# Patient Record
Sex: FEMALE | Race: BLACK OR AFRICAN AMERICAN
[De-identification: names, ages, dates, MRNs, and addresses within clinical notes are randomized per-mention and may not be internally consistent; named-entity substitution may affect disease eponyms.]

---

## 2021-01-07 ENCOUNTER — HOSPITAL ENCOUNTER (EMERGENCY)
Dept: HOSPITAL 35 - ER | Age: 41
Discharge: HOME | End: 2021-01-07
Payer: COMMERCIAL

## 2021-01-07 VITALS — SYSTOLIC BLOOD PRESSURE: 182 MMHG | DIASTOLIC BLOOD PRESSURE: 85 MMHG

## 2021-01-07 VITALS — HEIGHT: 67 IN | BODY MASS INDEX: 28.25 KG/M2 | WEIGHT: 180.01 LBS

## 2021-01-07 DIAGNOSIS — Z88.8: ICD-10-CM

## 2021-01-07 DIAGNOSIS — N18.6: ICD-10-CM

## 2021-01-07 DIAGNOSIS — Z91.018: ICD-10-CM

## 2021-01-07 DIAGNOSIS — R56.9: Primary | ICD-10-CM

## 2021-01-07 DIAGNOSIS — Z99.2: ICD-10-CM

## 2021-01-07 DIAGNOSIS — Z79.899: ICD-10-CM

## 2021-01-07 LAB
ALBUMIN SERPL-MCNC: 3.5 G/DL (ref 3.4–5)
ALT SERPL-CCNC: 20 U/L (ref 14–59)
ANION GAP SERPL CALC-SCNC: 15 MMOL/L (ref 7–16)
AST SERPL-CCNC: 28 U/L (ref 15–37)
BASOPHILS NFR BLD AUTO: 0.8 % (ref 0–2)
BILIRUB SERPL-MCNC: 0.5 MG/DL (ref 0.2–1)
BUN SERPL-MCNC: 19 MG/DL (ref 7–18)
CALCIUM SERPL-MCNC: 9.5 MG/DL (ref 8.5–10.1)
CHLORIDE SERPL-SCNC: 98 MMOL/L (ref 98–107)
CO2 SERPL-SCNC: 25 MMOL/L (ref 21–32)
CREAT SERPL-MCNC: 10.8 MG/DL (ref 0.6–1)
EOSINOPHIL NFR BLD: 1.6 % (ref 0–3)
ERYTHROCYTE [DISTWIDTH] IN BLOOD BY AUTOMATED COUNT: 16.2 % (ref 10.5–14.5)
GLUCOSE SERPL-MCNC: 107 MG/DL (ref 74–106)
GRANULOCYTES NFR BLD MANUAL: 76.4 % (ref 36–66)
HCT VFR BLD CALC: 35.8 % (ref 37–47)
HGB BLD-MCNC: 11.5 GM/DL (ref 12–15)
LYMPHOCYTES NFR BLD AUTO: 12.5 % (ref 24–44)
MCH RBC QN AUTO: 24.4 PG (ref 26–34)
MCHC RBC AUTO-ENTMCNC: 32 G/DL (ref 28–37)
MCV RBC: 76.3 FL (ref 80–100)
MONOCYTES NFR BLD: 8.7 % (ref 1–8)
NEUTROPHILS # BLD: 4.6 THOU/UL (ref 1.4–8.2)
PLATELET # BLD: 110 THOU/UL (ref 150–400)
POTASSIUM SERPL-SCNC: 4.3 MMOL/L (ref 3.5–5.1)
PROT SERPL-MCNC: 8.4 G/DL (ref 6.4–8.2)
RBC # BLD AUTO: 4.69 MIL/UL (ref 4.2–5)
SODIUM SERPL-SCNC: 138 MMOL/L (ref 136–145)
WBC # BLD AUTO: 6 THOU/UL (ref 4–11)

## 2021-08-09 ENCOUNTER — HOSPITAL ENCOUNTER (INPATIENT)
Dept: HOSPITAL 35 - ER | Age: 41
LOS: 2 days | Discharge: HOME | DRG: 100 | End: 2021-08-11
Attending: INTERNAL MEDICINE | Admitting: INTERNAL MEDICINE
Payer: COMMERCIAL

## 2021-08-09 VITALS — WEIGHT: 152 LBS | BODY MASS INDEX: 25.33 KG/M2 | HEIGHT: 65 IN

## 2021-08-09 VITALS — SYSTOLIC BLOOD PRESSURE: 149 MMHG | DIASTOLIC BLOOD PRESSURE: 96 MMHG

## 2021-08-09 DIAGNOSIS — Z82.49: ICD-10-CM

## 2021-08-09 DIAGNOSIS — F17.210: ICD-10-CM

## 2021-08-09 DIAGNOSIS — G40.909: Primary | ICD-10-CM

## 2021-08-09 DIAGNOSIS — Z20.822: ICD-10-CM

## 2021-08-09 DIAGNOSIS — Z91.02: ICD-10-CM
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
## 2021-08-09 DIAGNOSIS — I12.0: ICD-10-CM
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
## 2021-08-09 DIAGNOSIS — E44.0: ICD-10-CM

## 2021-08-09 DIAGNOSIS — Z90.5: ICD-10-CM

## 2021-08-09 DIAGNOSIS — K14.8: ICD-10-CM
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
## 2021-08-09 DIAGNOSIS — Z91.19: ICD-10-CM

## 2021-08-09 DIAGNOSIS — N18.6: ICD-10-CM

## 2021-08-09 DIAGNOSIS — Z84.1: ICD-10-CM

## 2021-08-09 DIAGNOSIS — S01.512A: ICD-10-CM

## 2021-08-09 DIAGNOSIS — Z88.8: ICD-10-CM

## 2021-08-09 DIAGNOSIS — R13.10: ICD-10-CM

## 2021-08-09 LAB
ALBUMIN SERPL-MCNC: 2.7 G/DL (ref 3.4–5)
ALT SERPL-CCNC: 103 U/L (ref 30–65)
ANION GAP SERPL CALC-SCNC: 12 MMOL/L (ref 7–16)
APTT BLD: 26.8 SECONDS (ref 24.5–32.8)
AST SERPL-CCNC: 177 U/L (ref 15–37)
BILIRUB SERPL-MCNC: 0.7 MG/DL (ref 0.2–1)
BUN SERPL-MCNC: 32 MG/DL (ref 7–18)
CALCIUM SERPL-MCNC: 8.7 MG/DL (ref 8.5–10.1)
CHLORIDE SERPL-SCNC: 102 MMOL/L (ref 98–107)
CO2 SERPL-SCNC: 25 MMOL/L (ref 21–32)
CREAT SERPL-MCNC: 10 MG/DL (ref 0.6–1)
GLUCOSE SERPL-MCNC: 82 MG/DL (ref 74–106)
INR PPP: 1.1
POTASSIUM SERPL-SCNC: 4.7 MMOL/L (ref 3.5–5.1)
PROT SERPL-MCNC: 7.7 G/DL (ref 6.4–8.2)
PROTHROMBIN TIME: 11.1 SECONDS (ref 9.3–11.4)
SODIUM SERPL-SCNC: 139 MMOL/L (ref 136–145)

## 2021-08-09 NOTE — EMS
[address]   [address]                     EMS Patient Care Report       
_______________________________________________________________________________
 
Name:       DEBI MOONEY            Room #:         214-P       [facility] [place]  
M.R.#:      [id number]                       Account #:      [id number]  
Admission:  21    Attend Phys:    Melissa Castillo MD   
Discharge:  21    Date of Birth:  [birth year]  
                                                          Report #: [id number]
                                                                    [id number]
_______________________________________________________________________________
THIS REPORT FOR:   //name//                          
 
Report Transmitted: 2021 09:41
EMS Care Summary
[place], [place]/[facility]
Incident [id number] @ 2021 01:16
 
Incident Location
[address] [place], [place] [id number]
 
Patient
DEBI MOONEY
Female, 41 Years
 1980
 
Patient Address
[address] [place], [place] [id number]
 
Patient History
Seizures,Dialysis,
 
 
Patient Medications
Carvedilol,
 
Chief Complaint
SWELLING AND BLEEDING TO TONGUE.
 
Disposition
Transported No Lights/[place]
 
Dispatch Reason
Hemorrhage/Laceration
 
Transported To
[facility]
 
Narrative
MEDIC 30 RESPONDS TO AN APARTMENT COMPLEX ON A REPORTED HEMORRHAGE. UPON 
ARRIVAL EMS FINDS ADULT FEMALE SITTING UPRIGHT ON BED WHILE HOLDING A TOWEL TO 
MOUTH. AS EMS APPROACHES, NUMEROUS BLOOD STAINS ARE VISIBLE TO TOWEL, AS WELL 
AS NUMEROUS OTHER ARTICLES OF CLOTHING, BED SHEETS AND SIMILAR ITEMS IN 
 
 
 
[address]   [address]                     EMS Patient Care Report       
_______________________________________________________________________________
 
Name:       DEBI MOONEY            Room #:         214-P       [facility] [place]  
[facility].#:      [id number]                       Account #:      [id number]  
Admission:  21    Attend Phys:    Melissa Castillo MD   
Discharge:  21    Date of Birth:  [birth year]  
                                                          Report #: [id number]
                                                                    [id number]
_______________________________________________________________________________
BEDROOM. PT'S ADULT SON PROVIDES HPI FOR PT AS PT APPEARS TO HAVE DIFFICULTY 
SPEAKING WITH MOUTH INJURY. SON REPORTS PT HAD MULTIPLE SEIZURES EARLIER THIS 
AFTERNOON, AND DURING ONE SEIZURE PT BIT HER TONGUE, CAUSING A WOUND AND 
EXCESSIVE BLEEDING. SON REPORTS PT WAS TREATED AT [facility] ED AND WAS 
DISCHARGED AT APPROXIMATELY 20:00-21:00 HOURS. SON REPORTS PT HAS CONTINUED TO 
HAVE BLEEDING AND DROOLING SINCE BEING DISCHARGED, AND SON HAS HAD TO PROVIDE 
WATER THROUGH A DROPPER TO GIVE PT FLUIDS AROUND SWOLLEN TONGUE. SON REPORTS 
BEING UPSET THAT PT WAS DISCHARGED AND REQUESTS PT BE REEVALUATED AT A 
DIFFERENT HOSPITAL. TONGUE APPEARS TO HAVE AN APPROXIMATE GOLF BALL SIZED 
AMOUNT OF SWELLING, TAKING UP THE MAJORITY OF ORAL AIRWAY. PT APPEARS TO HAVE 
SLOW AND GRADUAL DROOLING AND BLEEDING FROM TONGUE, WHICH PT CONTROLS WITH 
TOWEL. NO OTHER OBVIOUS EXTERNAL TRAUMA NOTED. RESPIRATIONS APPEAR ADEQUATE AND 
CURRENTLY NON LABORED. PT TRANSPORTED WITH ONGOING ASSESSMENT. REPORT TO STAFF 
UPON ARRIVAL. 
 
Initial Vitals
@01:39P: 106,R: 16,CO: 6,
@01:45P: 90,R: 16,BP: 151/92,SpO2: 96,
@01:34P: 98,R: 17,BP: 149/97,Pain: 10/10,GCS: 15,Revised Trauma: 12,
 
Assessments
@01:24MENTAL:Event Oriented,Place Oriented,Person Oriented,Time 
Oriented,SKIN:HEENT:Neck/Airway: Obstructed,Head/Face: Swelling,Head/Face: 
Other,LUNG SOUNDS:ABDOMEN:PELVIS//GI:EXTREMITIES:Left Arm: No 
Abnormalities,Right Arm: No Abnormalities,Left Leg: No Abnormalities,Right Leg: 
No Abnormalities,PULSE:Radial: 2+ Normal,NEURO:Slurred Speech,@01:35MENTAL:Time 
Oriented,Event Oriented,Person Oriented,Place Oriented,SKIN:HEENT:Neck/Airway: 
Obstructed,Head/Face: Swelling,LUNG 
SOUNDS:ABDOMEN:PELVIS//GI:EXTREMITIES:Left Arm: No Abnormalities,Right Arm: 
No Abnormalities,Left Leg: No Abnormalities,Right Leg: No 
Abnormalities,PULSE:NEURO:Slurred Speech, 
 
Impression
Hemorrhage
 
Procedures
@01:24ALS AssessmentResponse: UnchangedSucceeded@:31StretcherResponse: 
Unchanged 
 
Timeline
01:13,Call Received
01:13,Dispatch Notified
01:16,Dispatched
01:16,En Route
01:21,On Scene
01:24,At Patient
 
 
 
[address]   [address]                     EMS Patient Care Report       
_______________________________________________________________________________
 
Name:       DEBI MOONEY            Room #:         214-P       [facility] [place]  
M.R.#:      [id number]                       Account #:      [id number]  
Admission:  21    Attend Phys:    Melissa Castillo MD   
Discharge:  21    Date of Birth:  [birth year]  
                                                          Report #: [id number]
                                                                    [id number]
_______________________________________________________________________________
01:24,ALS Assessment,Response: UnchangedSucceeded,
01:31,Stretcher,Response: Unchanged
01:34,BP: 149/97 M,PULSE: 98,RR: 17 R,SPO2:  Ox,ETCO2:  ,BG: ,PAIN: 10,GCS: 15,
01:36,Depart Scene
01:39,BP: / M,PULSE: 106,RR: 16 R,SPO2:  Ox,ETCO2:  ,BG: ,PAIN: ,GCS: ,
01:45,BP: 151/92 M,PULSE: 90,RR: 16 R,SPO2: 96 Ox,ETCO2:  ,BG: ,PAIN: ,GCS: ,
01:46,At Destination
02:16,Call Closed
 
Disclaimer
v1.1     Copyright  [employer]
This EMS Care Summary contains data elements from the applicable legal record 
(which may be displayed differently). It is designed to provide pertinent 
information for the following purposes: continuity of care, clinical quality, 
and state data reporting. The complete legal record is available to ED staff 
and administrators of the receiving hospital in [employer]'s Patient Tracker. All data 
is provided "as is."

## 2021-08-09 NOTE — NUR
41-year-old female with past medical history of end-stage renal disease,
epilepsy who presented to the ED on 8-9-21 with tongue pain and swelling.
Patient reports that she was seen at [facility] approximately 3
to 4 hours ago after having a seizure.  She states that she was discharged
from the emergency department however since that time has noticed worsening
tongue swelling.  Per the ED patient presents A&O x4 and negative on ID NOW
with assessment of yes to vaccination.  Patient was seen ENT on 8-9 in the ED
and noted as ESRD with dialysis next scheduled for 8-11-21 and persistent
oozing of the tongue and difficulty swallowing.
 
CM will follow for discharge planning as medical care team establish
identified discharge needs as care continues and consultants continue to
review.

## 2021-08-09 NOTE — EEG
[facility]
[address]  [address]                      ELECTROENCEPHALOGRAM          
_______________________________________________________________________________
 
Name:       DEBI MOONEY             Room #:         214-P       ADM [place]  
M.R.#:      [id number]      Account #:      [id number]  
Admission:  08/09/21     Attend Phys:    Melissa Castillo MD  
Discharge:               Date of Birth:  [birth year]  
                                                           Report #: [id number]
    [id number]
_______________________________________________________________________________
THIS REPORT FOR:   //name//                          
 
DATE OF SERVICE: 08/10/2021
 
This patient is being evaluated for the possibility of seizure.  EEG was done by
placing the electrode by standard 10-20 system of electrode placement.  Both 
referential and sequential montages were used for recording.  Background 
activity in this patient's EEG is about 7 Hz and 30 microvolt.  This is a 
symmetrical activity.  Photic stimulation is unremarkable.  Throughout the 
record, no active epileptiform activity was noticed.
 
IMPRESSION:  This patient's EEG is unremarkable.
 
Thank you very much for this referral.
 
 
 
 
 
 
 
 
 
 
 
 
 
 
 
 
 
 
 
 
 
 
 
 
 
 
 
 
 
 
                              
   By:                               
                   
D: 08/11/21 1402                           _____________________________________
T: 08/11/21 1422                           Cullen Mcguire MD           /nt

## 2021-08-09 NOTE — EMS
[address]   [address]                     EMS Patient Care Report       
_______________________________________________________________________________
 
Name:       DEBI MOONEY            Room #:         170-3       ADM [place]  
M.R.#:      [id number]                       Account #:      [id number]  
Admission:  21    Attend Phys:    Melissa Castillo MD   
Discharge:              Date of Birth:  [birth year]  
                                                          Report #: [id number]
                                                                    [id number]
_______________________________________________________________________________
THIS REPORT FOR:   //name//                          
 
Report Transmitted: 08/10/2021 07:46
EMS Care Summary
[place], [place]/[facility]
Incident [id number] @ 2021 01:16
 
Incident Location
[address] [place] [address]
 
Patient
DEBI MOONEY
Female, 41 Years
 1980
 
Patient Address
[address] [place] [address]
 
Patient History
Seizures,Dialysis,
 
 
Patient Medications
Carvedilol,
 
Chief Complaint
SWELLING AND BLEEDING TO TONGUE.
 
Disposition
Transported No Lights/[place]
 
Dispatch Reason
Hemorrhage/Laceration
 
Transported To
[facility]
 
Narrative
MEDIC 30 RESPONDS TO AN APARTMENT COMPLEX ON A REPORTED HEMORRHAGE. UPON 
ARRIVAL EMS FINDS ADULT FEMALE SITTING UPRIGHT ON BED WHILE HOLDING A TOWEL TO 
MOUTH. AS EMS APPROACHES, NUMEROUS BLOOD STAINS ARE VISIBLE TO TOWEL, AS WELL 
AS NUMEROUS OTHER ARTICLES OF CLOTHING, BED SHEETS AND SIMILAR ITEMS IN 
 
 
 
[address]   [address]                     EMS Patient Care Report       
_______________________________________________________________________________
 
Name:       DEBI MOONEY            Room #:         170-3       ADM [place]  
.R.#:      [id number]                       Account #:      [id number]  
Admission:  21    Attend Phys:    Melissa Castillo MD   
Discharge:              Date of Birth:  [birth year]  
                                                          Report #: [id number]
                                                                    [id number]
_______________________________________________________________________________
BEDROOM. PT'S ADULT SON PROVIDES HPI FOR PT AS PT APPEARS TO HAVE DIFFICULTY 
SPEAKING WITH MOUTH INJURY. SON REPORTS PT HAD MULTIPLE SEIZURES EARLIER THIS 
AFTERNOON, AND DURING ONE SEIZURE PT BIT HER TONGUE, CAUSING A WOUND AND 
EXCESSIVE BLEEDING. SON REPORTS PT WAS TREATED AT [facility] ED AND WAS 
DISCHARGED AT APPROXIMATELY 20:00-21:00 HOURS. SON REPORTS PT HAS CONTINUED TO 
HAVE BLEEDING AND DROOLING SINCE BEING DISCHARGED, AND SON HAS HAD TO PROVIDE 
WATER THROUGH A DROPPER TO GIVE PT FLUIDS AROUND SWOLLEN TONGUE. SON REPORTS 
BEING UPSET THAT PT WAS DISCHARGED AND REQUESTS PT BE REEVALUATED AT A 
DIFFERENT HOSPITAL. TONGUE APPEARS TO HAVE AN APPROXIMATE GOLF BALL SIZED 
AMOUNT OF SWELLING, TAKING UP THE MAJORITY OF ORAL AIRWAY. PT APPEARS TO HAVE 
SLOW AND GRADUAL DROOLING AND BLEEDING FROM TONGUE, WHICH PT CONTROLS WITH 
TOWEL. NO OTHER OBVIOUS EXTERNAL TRAUMA NOTED. RESPIRATIONS APPEAR ADEQUATE AND 
CURRENTLY NON LABORED. PT TRANSPORTED WITH ONGOING ASSESSMENT. REPORT TO STAFF 
UPON ARRIVAL. 
 
Initial Vitals
@01:39P: 106,R: 16,CO: 6,
@01:45P: 90,R: 16,BP: 151/92,SpO2: 96,
@01:34P: 98,R: 17,BP: 149/97,Pain: 10/10,GCS: 15,Revised Trauma: 12,
 
Assessments
@01:24MENTAL:Event Oriented,Place Oriented,Person Oriented,Time 
Oriented,SKIN:HEENT:Neck/Airway: Obstructed,Head/Face: Swelling,Head/Face: 
Other,LUNG SOUNDS:ABDOMEN:PELVIS//GI:EXTREMITIES:Left Arm: No 
Abnormalities,Right Arm: No Abnormalities,Left Leg: No Abnormalities,Right Leg: 
No Abnormalities,PULSE:Radial: 2+ Normal,NEURO:Slurred 
Speech,@01:35MENTAL:Place Oriented,Person Oriented,Event Oriented,Time 
Oriented,SKIN:HEENT:Head/Face: Swelling,Neck/Airway: Obstructed,LUNG 
SOUNDS:ABDOMEN:PELVIS//GI:EXTREMITIES:Left Arm: No Abnormalities,Right Arm: 
No Abnormalities,Left Leg: No Abnormalities,Right Leg: No 
Abnormalities,PULSE:NEURO:Slurred Speech, 
 
Impression
Hemorrhage
 
Procedures
@01:24ALS AssessmentResponse: UnchangedSucceeded@:31StretcherResponse: 
Unchanged 
 
Timeline
01:13,Call Received
:13,Dispatch Notified
01:16,Dispatched
01:16,En Route
01:21,On Scene
01:24,At Patient
 
 
 
[place] [address]                     EMS Patient Care Report       
_______________________________________________________________________________
 
Name:       DEBI MOONEY            Room #:         170-3       ADM [place]  
..#:      [id number]                       Account #:      [id number]  
Admission:  21    Attend Phys:    Melissa Castillo MD   
Discharge:              Date of Birth:  [birth year]  
                                                          Report #: [id number]
                                                                    [id number]
_______________________________________________________________________________
01:24,ALS Assessment,Response: UnchangedSucceeded,
01:31,Stretcher,Response: Unchanged
01:34,BP: 149/97 M,PULSE: 98,RR: 17 R,SPO2:  Ox,ETCO2:  ,BG: ,PAIN: 10,GCS: 15,
01:36,Depart Scene
01:39,BP: / M,PULSE: 106,RR: 16 R,SPO2:  Ox,ETCO2:  ,BG: ,PAIN: ,GCS: ,
01:45,BP: 151/92 M,PULSE: 90,RR: 16 R,SPO2: 96 Ox,ETCO2:  ,BG: ,PAIN: ,GCS: ,
01:46,At Destination
02:16,Call Closed
 
Disclaimer
v1.1     Copyright  [employer]
This EMS Care Summary contains data elements from the applicable legal record 
(which may be displayed differently). It is designed to provide pertinent 
information for the following purposes: continuity of care, clinical quality, 
and state data reporting. The complete legal record is available to ED staff 
and administrators of the receiving hospital in [employer] Patient Tracker. All data 
is provided "as is."

## 2021-08-09 NOTE — HC
[facility]
[address]
[place]   [address]                     CONSULTATION                  
_______________________________________________________________________________
 
Name:       DEBI MOONEY            Room #:         170-3       ADM [place]  
M.R.#:      [id number]                       Account #:      [id number]  
Admission:  08/09/21    Attend Phys:    Melissa Castillo MD   
Discharge:              Date of Birth:  [birth year]  
                                                          Report #: [id number]
                                                                    [id number] 
_______________________________________________________________________________
THIS REPORT FOR:  
 
cc:  [facility] physician unknown
     [facility] physician unknown                                       
     Cullen Mcguire MD                                         ~
 
DATE OF SERVICE: 08/09/2021
 
HISTORY OF PRESENT ILLNESS:  This is a 41-year-old female patient who was 
admitted with multiple problems.  Neurology consultation is requested for 
evaluation of her seizures and I will strictly confine myself to that question. 
The patient gives a history that her seizure is longstanding, but it is 
infrequent.  Rather she did not remember when she had the last seizure prior to 
present seizure when she went to [facility].  Her tongue is very 
swollen and her speech is difficult to understand and therefore the history was 
not optimal.  She says that for her seizure she sees a neurologist at [facility].  She was trying to tell me the name, but I could not understand 
the name.  She gave me a history that she takes Keppra.  She thinks the strength
is 500 mg and she takes 1-1/2 tablet every morning, which is different than what
is there in the history, which is 1 tablet a day.  In any event, it looks like 
the best I can tell she takes 700 mg of Keppra daily.
 
REVIEW OF SYSTEMS: Indicates she says she used to drink alcohol a lot, rather 
she says her kidney failed because of that.  She is on dialysis.  She has a 
large tongue swelling.  She is seen by ENT.  Nephrology is already consulted and
I talked to the nurse looking after this patient and I understand they are going
to address the question of fluids in this patient.  She did receive Keppra 1000 
mg and it was given at 3:00 a.m. this morning according to the nurses.  From the
record, it looks like it was IV push and not piggyback and she tolerated that 
reasonably well.  She still has a lot of swelling in the tongue.  I do not have 
any records from [facility].  This was her relevant 14-point review
of systems.
 
PAST MEDICAL HISTORY:  Positive for seizure, but the best I can tell these 
seizures are not frequent and is controlled with relatively low renal dose of 
anti-seizure medication.
 
FAMILY HISTORY:  She took a long time to think, but ultimately told me there is 
no congenital epilepsy.
 
SOCIAL HISTORY:  She does drink alcohol, but not on a continuous basis like she 
used to.  She said she stopped drinking alcohol heavily once she was diagnosed 
with lupus, which she thinks was about 4-6 years ago.  She denies any use of 
drugs.  I do not have any records from [facility], so I do not know if drug screen
was tested there and it was not tested here and I am not even sure whether she 
forms the urine or not.
 
 
 
[address]   [address]                     CONSULTATION                  
_______________________________________________________________________________
 
Name:       DEBI MOONEY            Room #:         170-3       ADM [place]  
M.R.#:      [id number]                       Account #:      [id number]  
Admission:  08/09/21    Attend Phys:    Melissa Castillo MD   
Discharge:              Date of Birth:  [birth year]  
                                                          Report #: [id number]
                                                                    [id number] 
_______________________________________________________________________________
 
 
PHYSICAL EXAMINATION:  Indicate she is alert, responsive, able to follow simple 
commands.  Her speech is difficult to understand because of the tongue swelling,
but she does not appear to be aphasic, rather looks pretty alert.  Cranial nerve
examination except for massive tongue swelling, which makes impossible to look 
at the throat, it looks symmetrical.  I could not do very well on the visual 
field examination.  This patient has already been seen by ENT physician.  There 
is no meningeal sign.  I could not look at the patient's fundus.  The patient's 
strength, sensation, reflexes and tone is symmetrical.  She has a position sense
on both sides.  Both plantars are mute.  I did not make the patient walk, but 
does not appear that she has much ataxia in the upper extremities or lower 
extremity during the moment.  She does not appear to be in any respiratory 
difficulty.  She does not have any edema.  She has no thyroid mass.  There is no
carotid bruit.  Last blood pressure was 148/95, respirations 15, pulse is 96.
 
Lab indicate PTT was normal here.  Her creatinine is 10.  Magnesium was not 
done, at least I do not see it here.
 
I do not see any imaging study, which was done here.
 
IMPRESSION AND PLAN:  Breakthrough seizure.  Etiology is not clear.  She denies 
use of heavy drinking again.  She will need some workup in that regard.  She 
says she takes Keppra on a regular basis, but I do not know whether Keppra level
was done at [facility].
 
She got 1000 mg of Keppra last night.  I am going to get an EEG done.  I will 
ask for the record from [facility] to see what they did.  If they did not do any 
imaging study of the brain I think she needs an imaging study of the brain.  We 
will probably do a CT scan. Until she can swallow we will give the patient 
Keppra by IV push because she has tolerated that well on the first time and it 
has been found to be reasonably safe when the dose is less than 1000 mg.  Once 
she is able to swallow, then we will put her back on oral medication, but 
probably increase the dose to 1000 mg, but that also has to be renal dose and 
has to be adjusted according to dialysis.
 
Her history of lupus in the past is concerning because lupus people can have 
seizure. Because of that, she will need an MRI sometime as she did not have the 
recent one.  Presently, I am not sure it is a good idea because she will not be 
monitored and she appeared to be stable and doing reasonably well.  She has 
other problems like AST, ALT is high and I will defer the evaluation and 
management of those problems as well as other problems to hospitalist and other 
consultants they want to get involved and I will confine myself strictly to the 
patient's seizure.
 
 
 
[facility]
[address]
[place]   [address]                     CONSULTATION                  
_______________________________________________________________________________
 
Name:       DEBI MOONEY            Room #:         170-3       ADM [place]  
M.R.#:      [id number]                       Account #:      [id number]  
Admission:  08/09/21    Attend Phys:    Melissa Castillo MD   
Discharge:              Date of Birth:  [birth year]  
                                                          Report #: [id number]
                                                                    [id number] 
_______________________________________________________________________________
 
 
Thank you very much for this referral.
 
 
 
 
 
 
 
 
 
 
 
 
 
 
 
 
 
 
 
 
 
 
 
 
 
 
 
 
 
 
 
 
 
 
 
 
 
 
 
 
 
                         
   By:                               
                   
D: 08/09/21 1704                           _____________________________________
T: 08/10/21 0000                           Cullen Mcguire MD           /nt

## 2021-08-10 VITALS — DIASTOLIC BLOOD PRESSURE: 89 MMHG | SYSTOLIC BLOOD PRESSURE: 141 MMHG

## 2021-08-10 VITALS — SYSTOLIC BLOOD PRESSURE: 154 MMHG | DIASTOLIC BLOOD PRESSURE: 100 MMHG

## 2021-08-10 VITALS — DIASTOLIC BLOOD PRESSURE: 104 MMHG | SYSTOLIC BLOOD PRESSURE: 121 MMHG

## 2021-08-10 VITALS — DIASTOLIC BLOOD PRESSURE: 81 MMHG | SYSTOLIC BLOOD PRESSURE: 146 MMHG

## 2021-08-10 VITALS — DIASTOLIC BLOOD PRESSURE: 104 MMHG | SYSTOLIC BLOOD PRESSURE: 154 MMHG

## 2021-08-10 LAB
ALBUMIN SERPL-MCNC: 3 G/DL (ref 3.4–5)
ANION GAP SERPL CALC-SCNC: 12 MMOL/L (ref 7–16)
BASOPHILS NFR BLD AUTO: 0.6 % (ref 0–2)
BUN SERPL-MCNC: 46 MG/DL (ref 7–18)
CALCIUM SERPL-MCNC: 9 MG/DL (ref 8.5–10.1)
CHLORIDE SERPL-SCNC: 102 MMOL/L (ref 98–107)
CO2 SERPL-SCNC: 26 MMOL/L (ref 21–32)
CREAT SERPL-MCNC: 12.8 MG/DL (ref 0.6–1)
EOSINOPHIL NFR BLD: 0 % (ref 0–3)
ERYTHROCYTE [DISTWIDTH] IN BLOOD BY AUTOMATED COUNT: 18.8 % (ref 10.5–14.5)
GLUCOSE SERPL-MCNC: 95 MG/DL (ref 74–106)
GRANULOCYTES NFR BLD MANUAL: 82.7 % (ref 36–66)
HCT VFR BLD CALC: 33.6 % (ref 37–47)
HGB BLD-MCNC: 10.7 GM/DL (ref 12–15)
LYMPHOCYTES NFR BLD AUTO: 10.5 % (ref 24–44)
MAGNESIUM SERPL-MCNC: 2.5 MG/DL (ref 1.8–2.4)
MCH RBC QN AUTO: 23.7 PG (ref 26–34)
MCHC RBC AUTO-ENTMCNC: 31.8 G/DL (ref 28–37)
MCV RBC: 74.6 FL (ref 80–100)
MONOCYTES NFR BLD: 6.2 % (ref 1–8)
NEUTROPHILS # BLD: 4.6 THOU/UL (ref 1.4–8.2)
PHOSPHATE SERPL-MCNC: 8 MG/DL (ref 2.6–4.7)
PLATELET # BLD: 110 THOU/UL (ref 150–400)
POTASSIUM SERPL-SCNC: 5.9 MMOL/L (ref 3.5–5.1)
RBC # BLD AUTO: 4.5 MIL/UL (ref 4.2–5)
SODIUM SERPL-SCNC: 140 MMOL/L (ref 136–145)
WBC # BLD AUTO: 5.6 THOU/UL (ref 4–11)

## 2021-08-10 NOTE — NUR
PATIENT COMES FROM HOME. DISCHARGED FROM MEDICAL RESEARCH AFTER HAVING A
SEIZURE. PATIENT HAD ANOTHER SEIZURE, BIT HER TOUNGE AND HER TONGUE CONTINUED
TO SWELL. PATIENT CAME TO ED. ADMITTED TO CCU AT 1130 FOR MONITORING AND
DIALYSIS. PATIENT REGULAR DIALYSIS PATIENT TUESDAY, THURDAY AND SATURDAY.

## 2021-08-10 NOTE — NUR
PATIENT ARRIVES AT 1130. GETTING DIALYSIS TODAY. ORIENTED AND PLEASANT. DIET
ORDERED AND TOLERATING.

## 2021-08-11 VITALS — SYSTOLIC BLOOD PRESSURE: 127 MMHG | DIASTOLIC BLOOD PRESSURE: 74 MMHG

## 2021-08-11 VITALS — DIASTOLIC BLOOD PRESSURE: 90 MMHG | SYSTOLIC BLOOD PRESSURE: 130 MMHG

## 2021-08-11 VITALS — SYSTOLIC BLOOD PRESSURE: 127 MMHG | DIASTOLIC BLOOD PRESSURE: 84 MMHG

## 2021-08-11 VITALS — DIASTOLIC BLOOD PRESSURE: 90 MMHG | SYSTOLIC BLOOD PRESSURE: 134 MMHG

## 2021-08-11 VITALS — SYSTOLIC BLOOD PRESSURE: 144 MMHG | DIASTOLIC BLOOD PRESSURE: 96 MMHG

## 2021-08-11 VITALS — DIASTOLIC BLOOD PRESSURE: 84 MMHG | SYSTOLIC BLOOD PRESSURE: 127 MMHG

## 2021-08-11 NOTE — NUR
DIALYSIS FINISHED WITH /110 HR 89 RR16 AND TEMP 98.0, 2310ML OF FLUID
REMOVED, STATES HER TONGUE IS FEELING MUCH BETTER, BG@ 2031 WAS 56 1/2 AMP D50
GIVEN AND RECHECKED  @ 52 ORDERED STAT LAB AND CAME BACK , NO C/O PAIN,
WILL CON'T TO MONITOR PER PPOC.

## 2021-08-11 NOTE — NUR
PT IS AXOX4, PLEASANT; DENIES PAIN, SOME TENDERNESS ON THE TONGUE; TONGUE HAS
SCAB AND IS SLIGHTLY SWOLLEN; VSS, AFEBRILE, SR ON THE MONITOR. PT IS ON
SEIZURE PRECAUTIONS. PT IS ON STDBY ASST TO TOILET. DR ESCOBAR CONSULTED; PT TO
D/C HOME. CASE MGMT CONSULTED; FAMILY NOTIFIED AND ARRANGEMENTS MADE FOR RIDE.
DISCHARGE EDUCATION CONDUCTED. PT COMMUNICATED UNDERSTANDING. PT D/C HOME WITH
FAMILY VIA PERSONAL VEHICLE. NO CONCERNS AT THIS TIME.

## 2021-08-11 NOTE — NUR
Possible dc home later today if pt tolerates po intake and iv steriods dc'd.
Pt normally dialyzes at [facility] T/TH/SAT and is indep with gait and adl's.
CM to confirm dc with her clinic and fax her dc summary for resumption of her
outpt dialysis tx. No other needs noted at this time. Pt has insurance in
place for scripts and f/u care.